# Patient Record
Sex: FEMALE | Race: BLACK OR AFRICAN AMERICAN | URBAN - METROPOLITAN AREA
[De-identification: names, ages, dates, MRNs, and addresses within clinical notes are randomized per-mention and may not be internally consistent; named-entity substitution may affect disease eponyms.]

---

## 2017-01-09 ENCOUNTER — OFFICE VISIT (OUTPATIENT)
Dept: NEUROLOGY | Age: 47
End: 2017-01-09

## 2017-01-09 VITALS
HEIGHT: 63 IN | SYSTOLIC BLOOD PRESSURE: 138 MMHG | BODY MASS INDEX: 29.06 KG/M2 | DIASTOLIC BLOOD PRESSURE: 70 MMHG | HEART RATE: 89 BPM | OXYGEN SATURATION: 98 % | WEIGHT: 164 LBS

## 2017-01-09 DIAGNOSIS — M79.641 RIGHT HAND PAIN: ICD-10-CM

## 2017-01-09 DIAGNOSIS — R20.0 NUMBNESS OF RIGHT HAND: Primary | ICD-10-CM

## 2017-01-09 RX ORDER — AMITRIPTYLINE HYDROCHLORIDE 10 MG/1
10 TABLET, FILM COATED ORAL
Qty: 30 TAB | Refills: 5 | Status: SHIPPED | OUTPATIENT
Start: 2017-01-09 | End: 2018-01-12

## 2017-01-09 NOTE — PATIENT INSTRUCTIONS
[facility]   Statement to Patients  April 1, 2014      In an effort to ensure the large volume of patient prescription refills is processed in the most efficient and expeditious manner, we are asking our patients to assist us by calling your Pharmacy for all prescription refills, this will include also your  Mail Order Pharmacy. The pharmacy will contact our office electronically to continue the refill process. Please do not wait until the last minute to call your pharmacy. We need at least 48 hours (2days) to fill prescriptions. We also encourage you to call your pharmacy before going to  your prescription to make sure it is ready. With regard to controlled substance prescription refill requests (narcotic refills) that need to be picked up at our office, we ask your cooperation by providing us with at least 72 hours (3days) notice that you will need a refill. We will not refill narcotic prescription refill requests after 4:00pm on any weekday, Monday through Thursday, or after 2:00pm on Fridays, or on the weekends. We encourage everyone to explore another way of getting your prescription refill request processed using [employer], our patient web portal through our electronic medical record system. [employer] is an efficient and effective way to communicate your medication request directly to the office and  downloadable as an deepali on your smart phone . [employer] also features a review functionality that allows you to view your medication list as well as leave messages for your physician. Are you ready to get connected? If so please review the attatched instructions or speak to any of our staff to get you set up right away! Thank you so much for your cooperation. Should you have any questions please contact our Practice Administrator.     The Physicians and Staff,  [facility]     Patient Instructions/Plans:         Carpal Tunnel Syndrome: Exercises  Your Care Instructions  Here are some examples of typical rehabilitation exercises for your condition. Start each exercise slowly. Ease off the exercise if you start to have pain. Your doctor or your physical or occupational therapist will tell you when you can start these exercises and which ones will work best for you. How to do the exercises  Note: When you no longer have pain or numbness, you can do exercises to help prevent carpal tunnel syndrome from coming back. Do not do any stretch or movement that is uncomfortable or painful. Warm-up stretches  1. Rotate your wrist up, down, and from side to side. Repeat 4 times. 2. Stretch your fingers far apart. Relax them, and then stretch them again. Repeat 4 times. 3. Stretch your thumb by pulling it back gently, holding it, and then releasing it. Repeat 4 times. Prayer stretch    1. Start with your palms together in front of your chest just below your chin. 2. Slowly lower your hands toward your waistline, keeping your hands close to your stomach and your palms together until you feel a mild to moderate stretch under your forearms. 3. Hold for at least 15 to 30 seconds. Repeat 2 to 4 times. Wrist flexor stretch    1. Extend your arm in front of you with your palm up. 2. Bend your wrist, pointing your hand toward the floor. 3. With your other hand, gently bend your wrist farther until you feel a mild to moderate stretch in your forearm. 4. Hold for at least 15 to 30 seconds. Repeat 2 to 4 times. Wrist extensor stretch    Repeat steps 1 through 4 of the stretch above, but begin with your extended hand palm down. Follow-up care is a key part of your treatment and safety. Be sure to make and go to all appointments, and call your doctor if you are having problems. It's also a good idea to know your test results and keep a list of the medicines you take. Where can you learn more?   Go to http://yari-jana.info/. Enter D126 in the search box to learn more about \"Carpal Tunnel Syndrome: Exercises. \"  Current as of: May 23, 2016  Content Version: 11.1  © 4258-8815 [employer]. Care instructions adapted under license by [employer] (which disclaims liability or warranty for this information). If you have questions about a medical condition or this instruction, always ask your healthcare professional. [address] any warranty or liability for your use of this information. Amitriptyline (By mouth)   Amitriptyline (am-i-TRIP-ti-darren)  Treats depression. This medicine is a TCA. Brand Name(s):   There may be other brand names for this medicine. When This Medicine Should Not Be Used: This medicine is not right for everyone. Do not use if you had an allergic reaction to amitriptyline. How to Use This Medicine:   Tablet  · Take your medicine as directed. Your dose may need to be changed several times to find what works best for you. · This medicine should come with a Medication Guide. Ask your pharmacist for a copy if you do not have one. · Store the medicine in a closed container at room temperature, away from heat, moisture, and direct light. · Missed dose: Take a dose as soon as you remember. If it is almost time for your next dose, wait until then and take a regular dose. Do not take extra medicine to make up for a missed dose. Drugs and Foods to Avoid:   Ask your doctor or pharmacist before using any other medicine, including over-the-counter medicines, vitamins, and herbal products. · Do not use this medicine with cisapride. Do not use this medicine and an MAO inhibitor (MAOI) within 14 days of each other. · Some medicines and foods can affect how amitriptyline works. Tell your doctor if you are using cimetidine, disulfiram, or guanethidine.  Tell your doctor if you are using other medicine for depression (such as fluoxetine, paroxetine, sertraline), a phenothiazine medicine (such as promethazine, chlorpromazine), medicine for heart rhythm problems (such as flecainide, propafenone, quinidine), or thyroid medicine. · Tell your doctor if you use anything else that makes you sleepy. Some examples are allergy medicine, narcotic pain medicine, and alcohol. Warnings While Using This Medicine:   · Tell your doctor if you are pregnant or breastfeeding, or if you have liver disease, heart disease, diabetes, narrow-angle glaucoma, a seizure disorder, a thyroid problem, or trouble urinating. · For some children, teenagers, and young adults, this medicine may increase mental or emotional problems. This may lead to thoughts of suicide and violence. Talk with your doctor right away if you have any thoughts or behavior changes that concern you. Tell your doctor if you or anyone in your family has a history of bipolar disorder or suicide attempts. · This medicine may cause the following problems:   ¨ Heart rhythm problems  ¨ High or low blood sugar levels  · This medicine may make you dizzy or drowsy. Do not drive or do anything else that could be dangerous until you know how this medicine affects you. · Do not stop using this medicine suddenly. Your doctor will need to slowly decrease your dose before you stop it completely. · Keep all medicine out of the reach of children. Never share your medicine with anyone.   Possible Side Effects While Using This Medicine:   Call your doctor right away if you notice any of these side effects:  · Allergic reaction: Itching or hives, swelling in your face or hands, swelling or tingling in your mouth or throat, chest tightness, trouble breathing  · Anxiety, restlessness, seeing or hearing things that are not there  · Chest pain, trouble breathing  · Fast, pounding, or uneven heartbeat  · Feeling more excited or energetic than usual, racing thoughts, trouble sleeping  · Lightheadedness, dizziness, or fainting  · Seizures  · Thoughts of hurting yourself or others, unusual behavior  · Unusual bleeding or bruising  If you notice these less serious side effects, talk with your doctor:   · Blurred vision, dry mouth, fever  · Change in how much or how often you urinate  · Constipation, diarrhea, nausea, vomiting  · Drowsiness, sleepiness  · Sexual problems  If you notice other side effects that you think are caused by this medicine, tell your doctor. Call your doctor for medical advice about side effects. You may report side effects to FDA at 8-223-GJQ-7245  © 2016 [address] is for End User's use only and may not be sold, redistributed or otherwise used for commercial purposes. The above information is an  only. It is not intended as medical advice for individual conditions or treatments. Talk to your doctor, nurse or pharmacist before following any medical regimen to see if it is safe and effective for you.

## 2017-01-09 NOTE — MR AVS SNAPSHOT
Visit Information Date & Time Provider Department Dept. Phone Encounter #  
 1/9/2017 10:00 AM Praveena Ortiz MD [facility] [phone] [id number] Your Appointments 1/23/2017  3:00 PM  
COMPLETE PHYSICAL with Giovanni Rasheed [facility], [address] ([facility]) Appt Note: chp  
 [address] 
[address] [place] [id number]  
[phone]  
  
   
 [address] [facility] [address] [facility] [address] [facility] Upcoming Health Maintenance Date Due DTaP/Tdap/Td series (1 - Tdap) 2/27/2017* PAP AKA CERVICAL CYTOLOGY 11/17/2019 *Topic was postponed. The date shown is not the original due date. Allergies as of 1/9/2017  Review Complete On: 1/9/2017 By: Zafar Napier LPN Severity Noted Reaction Type Reactions Percocet [Oxycodone-acetaminophen]  10/26/2012    Other (comments) Feels like bugs are crawling on her. Current Immunizations  Never Reviewed No immunizations on file. Not reviewed this visit You Were Diagnosed With   
  
 Codes Comments Numbness of right hand    -  Primary ICD-10-CM: R20.0 ICD-9-CM: 782.0 Right hand pain     ICD-10-CM: M79.641 ICD-9-CM: 729.5 Vitals BP Pulse Height(growth percentile) Weight(growth percentile) SpO2 BMI  
 138/70 89 5' 3\" (1.6 m) 164 lb (74.4 kg) 98% 29.05 kg/m2 OB Status Smoking Status Having regular periods Never Smoker Vitals History BMI and BSA Data Body Mass Index Body Surface Area 29.05 kg/m 2 1.82 m 2 Preferred [facility] [phone] [facility] [address], [address] AT [facility] [address] [phone] Your Updated Medication List  
  
   
This list is accurate as of: 1/9/17 10:48 AM.  Always use your most recent med list.  
  
  
  
  
 amitriptyline 10 mg tablet Commonly known as:  ELAVIL  
 Take 1 Tab by mouth nightly. Arm Brace Misc Commonly known as:  NEOPRENE WRIST SPLINT SUPPORT Wrist splint for right and left. For carpal tunnel. Splint should hold wrist in about 30-45 degrees of extension. Take to medical supply store. Prescriptions Sent to Pharmacy Refills  
 amitriptyline (ELAVIL) 10 mg tablet 5 Sig: Take 1 Tab by mouth nightly. Class: Normal  
 Pharmacy: [facility] [address] [place] [address] [address] [place] [address] Ph #: [phone] Route: Oral  
  
We Performed the Following REFERRAL TO NEUROLOGY [QYY91 Custom] Comments:  
 EMG/NCS for eval for CTS. R>L Referral Information Referral ID Referred By Referred To  
  
 [id number] Junito VALDEZ MD   
   [address] [place] [address] [address] [facility] [address], [address] [facility] Phone: [phone] Fax: [phone] Visits Status Start Date End Date 1 New Request 1/9/17 1/9/18 If your referral has a status of pending review or denied, additional information will be sent to support the outcome of this decision. Patient Instructions PRESCRIPTION REFILL POLICY [facility] Statement to Patients April 1, 2014 In an effort to ensure the large volume of patient prescription refills is processed in the most efficient and expeditious manner, we are asking our patients to assist us by calling your Pharmacy for all prescription refills, this will include also your  Mail Order Pharmacy. The pharmacy will contact our office electronically to continue the refill process. Please do not wait until the last minute to call your pharmacy. We need at least 48 hours (2days) to fill prescriptions. We also encourage you to call your pharmacy before going to  your prescription to make sure it is ready.   
 
With regard to controlled substance prescription refill requests (narcotic refills) that need to be picked up at our office, we ask your cooperation by providing us with at least 72 hours (3days) notice that you will need a refill. We will not refill narcotic prescription refill requests after 4:00pm on any weekday, Monday through Thursday, or after 2:00pm on Fridays, or on the weekends. We encourage everyone to explore another way of getting your prescription refill request processed using [employer], our patient web portal through our electronic medical record system. [employer] is an efficient and effective way to communicate your medication request directly to the office and  downloadable as an deepali on your smart phone . [employer] also features a review functionality that allows you to view your medication list as well as leave messages for your physician. Are you ready to get connected? If so please review the attatched instructions or speak to any of our staff to get you set up right away! Thank you so much for your cooperation. Should you have any questions please contact our Practice Administrator. The Physicians and Staff,  [facility] Patient Instructions/Plans: 
 
 
  
Carpal Tunnel Syndrome: Exercises Your Care Instructions Here are some examples of typical rehabilitation exercises for your condition. Start each exercise slowly. Ease off the exercise if you start to have pain. Your doctor or your physical or occupational therapist will tell you when you can start these exercises and which ones will work best for you. How to do the exercises Note: When you no longer have pain or numbness, you can do exercises to help prevent carpal tunnel syndrome from coming back. Do not do any stretch or movement that is uncomfortable or painful. Warm-up stretches 1. Rotate your wrist up, down, and from side to side. Repeat 4 times. 2. Stretch your fingers far apart. Relax them, and then stretch them again. Repeat 4 times. 3. Stretch your thumb by pulling it back gently, holding it, and then releasing it. Repeat 4 times. Prayer stretch 1. Start with your palms together in front of your chest just below your chin. 2. Slowly lower your hands toward your waistline, keeping your hands close to your stomach and your palms together until you feel a mild to moderate stretch under your forearms. 3. Hold for at least 15 to 30 seconds. Repeat 2 to 4 times. Wrist flexor stretch 1. Extend your arm in front of you with your palm up. 2. Bend your wrist, pointing your hand toward the floor. 3. With your other hand, gently bend your wrist farther until you feel a mild to moderate stretch in your forearm. 4. Hold for at least 15 to 30 seconds. Repeat 2 to 4 times. Wrist extensor stretch Repeat steps 1 through 4 of the stretch above, but begin with your extended hand palm down. Follow-up care is a key part of your treatment and safety. Be sure to make and go to all appointments, and call your doctor if you are having problems. It's also a good idea to know your test results and keep a list of the medicines you take. Where can you learn more? Go to http://yari-jana.info/. Enter Y256 in the search box to learn more about \"Carpal Tunnel Syndrome: Exercises. \" Current as of: May 23, 2016 Content Version: 11.1 © 1534-4031 [employer]. Care instructions adapted under license by [employer] (which disclaims liability or warranty for this information). If you have questions about a medical condition or this instruction, always ask your healthcare professional. [place] [address] any warranty or liability for your use of this information. Amitriptyline (By mouth) Amitriptyline (am-i-TRIP-ti-darren) Treats depression. This medicine is a TCA. Brand Name(s):  
There may be other brand names for this medicine. When This Medicine Should Not Be Used: This medicine is not right for everyone. Do not use if you had an allergic reaction to amitriptyline. How to Use This Medicine:  
Tablet · Take your medicine as directed. Your dose may need to be changed several times to find what works best for you. · This medicine should come with a Medication Guide. Ask your pharmacist for a copy if you do not have one. · Store the medicine in a closed container at room temperature, away from heat, moisture, and direct light. · Missed dose: Take a dose as soon as you remember. If it is almost time for your next dose, wait until then and take a regular dose. Do not take extra medicine to make up for a missed dose. Drugs and Foods to Avoid: Ask your doctor or pharmacist before using any other medicine, including over-the-counter medicines, vitamins, and herbal products. · Do not use this medicine with cisapride. Do not use this medicine and an MAO inhibitor (MAOI) within 14 days of each other. · Some medicines and foods can affect how amitriptyline works. Tell your doctor if you are using cimetidine, disulfiram, or guanethidine. Tell your doctor if you are using other medicine for depression (such as fluoxetine, paroxetine, sertraline), a phenothiazine medicine (such as promethazine, chlorpromazine), medicine for heart rhythm problems (such as flecainide, propafenone, quinidine), or thyroid medicine. · Tell your doctor if you use anything else that makes you sleepy. Some examples are allergy medicine, narcotic pain medicine, and alcohol. Warnings While Using This Medicine: · Tell your doctor if you are pregnant or breastfeeding, or if you have liver disease, heart disease, diabetes, narrow-angle glaucoma, a seizure disorder, a thyroid problem, or trouble urinating. · For some children, teenagers, and young adults, this medicine may increase mental or emotional problems.  This may lead to thoughts of suicide and violence. Talk with your doctor right away if you have any thoughts or behavior changes that concern you. Tell your doctor if you or anyone in your family has a history of bipolar disorder or suicide attempts. · This medicine may cause the following problems:  
¨ Heart rhythm problems ¨ High or low blood sugar levels · This medicine may make you dizzy or drowsy. Do not drive or do anything else that could be dangerous until you know how this medicine affects you. · Do not stop using this medicine suddenly. Your doctor will need to slowly decrease your dose before you stop it completely. · Keep all medicine out of the reach of children. Never share your medicine with anyone. Possible Side Effects While Using This Medicine:  
Call your doctor right away if you notice any of these side effects: · Allergic reaction: Itching or hives, swelling in your face or hands, swelling or tingling in your mouth or throat, chest tightness, trouble breathing · Anxiety, restlessness, seeing or hearing things that are not there · Chest pain, trouble breathing · Fast, pounding, or uneven heartbeat · Feeling more excited or energetic than usual, racing thoughts, trouble sleeping · Lightheadedness, dizziness, or fainting · Seizures · Thoughts of hurting yourself or others, unusual behavior · Unusual bleeding or bruising If you notice these less serious side effects, talk with your doctor: · Blurred vision, dry mouth, fever · Change in how much or how often you urinate · Constipation, diarrhea, nausea, vomiting · Drowsiness, sleepiness · Sexual problems If you notice other side effects that you think are caused by this medicine, tell your doctor. Call your doctor for medical advice about side effects. You may report side effects to FDA at 4-699-FDA-0112 © 2016 [address] is for End User's use only and may not be sold, redistributed or otherwise used for commercial purposes. The above information is an  only. It is not intended as medical advice for individual conditions or treatments. Talk to your doctor, nurse or pharmacist before following any medical regimen to see if it is safe and effective for you. Introducing [facility]! Dear Devon Perez: Thank you for requesting a [employer] account. Our records indicate that you already have an active [employer] account. You can access your account anytime at [email or web address] Did you know that you can access your hospital and ER discharge instructions at any time in [employer]? You can also review all of your test results from your hospital stay or ER visit. Additional Information If you have questions, please visit the Frequently Asked Questions section of the [employer] website at [email or web address] Remember, [employer] is NOT to be used for urgent needs. For medical emergencies, dial 911. Now available from your iPhone and Android! Please provide this summary of care documentation to your next provider. Your primary care clinician is listed as [place] [employer]. Jamee Vanegas. If you have any questions after today's visit, please call [phone].
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
## 2017-01-23 ENCOUNTER — OFFICE VISIT (OUTPATIENT)
Dept: FAMILY MEDICINE CLINIC | Age: 47
End: 2017-01-23

## 2017-01-23 VITALS
WEIGHT: 161.4 LBS | RESPIRATION RATE: 16 BRPM | TEMPERATURE: 98 F | SYSTOLIC BLOOD PRESSURE: 131 MMHG | HEIGHT: 63 IN | HEART RATE: 73 BPM | DIASTOLIC BLOOD PRESSURE: 67 MMHG | BODY MASS INDEX: 28.6 KG/M2 | OXYGEN SATURATION: 97 %

## 2017-01-23 DIAGNOSIS — R20.2 PARESTHESIA: ICD-10-CM

## 2017-01-23 DIAGNOSIS — R73.03 PREDIABETES: ICD-10-CM

## 2017-01-23 DIAGNOSIS — J31.0 OTHER RHINITIS: ICD-10-CM

## 2017-01-23 DIAGNOSIS — M22.2X9 PATELLOFEMORAL PAIN SYNDROME, UNSPECIFIED LATERALITY: ICD-10-CM

## 2017-01-23 DIAGNOSIS — Z01.419 WELL WOMAN EXAM: Primary | ICD-10-CM

## 2017-01-23 DIAGNOSIS — R09.89 RALES: ICD-10-CM

## 2017-01-23 DIAGNOSIS — T14.8XXA MUSCLE STRAIN: ICD-10-CM

## 2017-01-23 RX ORDER — FLUTICASONE PROPIONATE 50 MCG
2 SPRAY, SUSPENSION (ML) NASAL DAILY
Qty: 1 BOTTLE | Refills: 11 | Status: SHIPPED | OUTPATIENT
Start: 2017-01-23 | End: 2018-01-12

## 2017-01-23 NOTE — LETTER
1/23/2017 3:40 PM 
 
[facility] [address] [address] No visits with results within 1 Month(s) from this visit. Latest known visit with results is: 
 
Office Visit on 11/29/2016 Component Date Value Ref Range Status  Hemoglobin A1c 12/05/2016 5.8* 4.8 - 5.6 % Final  
 Estimated average glucose 12/05/2016 120  mg/dL Final  
 Glucose 12/05/2016 84  65 - 99 mg/dL Final  
 BUN 12/05/2016 12  6 - 24 mg/dL Final  
 Creatinine 12/05/2016 0.77  0.57 - 1.00 mg/dL Final  
 GFR est non-AA 12/05/2016 94  >59 mL/min/1.73 Final  
 GFR est AA 12/05/2016 108  >59 mL/min/1.73 Final  
 BUN/Creatinine ratio 12/05/2016 16  9 - 23 Final  
 Sodium 12/05/2016 141  136 - 144 mmol/L Final  
 Potassium 12/05/2016 4.2  3.5 - 5.2 mmol/L Final  
 Chloride 12/05/2016 103  97 - 106 mmol/L Final  
 CO2 12/05/2016 24  18 - 29 mmol/L Final  
 Calcium 12/05/2016 9.2  8.7 - 10.2 mg/dL Final  
 Protein, total 12/05/2016 6.4  6.0 - 8.5 g/dL Final  
 Albumin 12/05/2016 4.0  3.5 - 5.5 g/dL Final  
 GLOBULIN, TOTAL 12/05/2016 2.4  1.5 - 4.5 g/dL Final  
 A-G Ratio 12/05/2016 1.7  1.1 - 2.5 Final  
 Bilirubin, total 12/05/2016 0.2  0.0 - 1.2 mg/dL Final  
 Alk. phosphatase 12/05/2016 58  39 - 117 IU/L Final  
 AST 12/05/2016 18  0 - 40 IU/L Final  
 ALT 12/05/2016 17  0 - 32 IU/L Final  
 WBC 12/05/2016 5.0  3.4 - 10.8 x10E3/uL Final  
 RBC 12/05/2016 4.10  3.77 - 5.28 x10E6/uL Final  
 HGB 12/05/2016 11.4  11.1 - 15.9 g/dL Final  
 HCT 12/05/2016 36.4  34.0 - 46.6 % Final  
 MCV 12/05/2016 89  79 - 97 fL Final  
 MCH 12/05/2016 27.8  26.6 - 33.0 pg Final  
 MCHC 12/05/2016 31.3* 31.5 - 35.7 g/dL Final  
 RDW 12/05/2016 14.4  12.3 - 15.4 % Final  
 PLATELET 53/84/1797 645  150 - 379 x10E3/uL Final  
 
 
 
 
Sincerely, 
 
 
Giovanni GrimaldoNovant Health [facility]ant[facility], MD

## 2017-01-23 NOTE — MR AVS SNAPSHOT
Visit Information Date & Time Provider Department Dept. Phone Encounter #  
 1/23/2017  3:00 PM Elver Catalan. Jorge Mejia MD [facility] [phone] [id number] Your Appointments 2/20/2017 11:00 AM  
PROCEDURE with Agatha Underwood MD  
[facility]) Appt Note: EMG $0CP CC tdb 1/9/17  
 [address] [place] [address], [address] [phone]  
[address], [address] [place] [address], [address] [id number]  
  
    
 4/10/2017 10:00 AM  
Follow Up with Natalia Lopez NP [facility]) Appt Note: Follow up $0CP tdb 1/9/17  
 [address] [place] [address], 
[address] [place] [address], [address] [phone]  
[address] [facility], [address] [place] [address], [address] [phone] Upcoming Health Maintenance Date Due DTaP/Tdap/Td series (1 - Tdap) 2/27/2017* PAP AKA CERVICAL CYTOLOGY 11/24/2019 *Topic was postponed. The date shown is not the original due date. Allergies as of 1/23/2017  Review Complete On: 1/23/2017 By: Tiarra Wilder RN Severity Noted Reaction Type Reactions Percocet [Oxycodone-acetaminophen]  10/26/2012    Other (comments) Feels like bugs are crawling on her. Current Immunizations  Never Reviewed No immunizations on file. Not reviewed this visit You Were Diagnosed With   
  
 Codes Comments Prediabetes    -  Primary ICD-10-CM: R73.03 
ICD-9-CM: 790.29 Rales     ICD-10-CM: R09.89 ICD-9-CM: 626. 7 Well woman exam     ICD-10-CM: Y19.506 ICD-9-CM: V72.31 Other rhinitis     ICD-10-CM: J31.0 Vitals BP Pulse Temp Resp Height(growth percentile) Weight(growth percentile) 131/67 (BP 1 Location: Left arm, BP Patient Position: Sitting) 73 98 °F (36.7 °C) (Oral) 16 5' 3.25\" (1.607 m) 161 lb 6.4 oz (73.2 kg) LMP SpO2 BMI OB Status Smoking Status 2017 97% 28.37 kg/m2 Having regular periods Never Smoker Vitals History BMI and BSA Data Body Mass Index Body Surface Area  
 28.37 kg/m 2 1.81 m 2 Preferred Pharmacy Pharmacy Name Phone [facility] [phone] , [address] [facility] [place] [address] [phone] Your Updated Medication List  
  
   
This list is accurate as of: 17  4:30 PM.  Always use your most recent med list.  
  
  
  
  
 amitriptyline 10 mg tablet Commonly known as:  ELAVIL Take 1 Tab by mouth nightly. fluticasone 50 mcg/actuation nasal spray Commonly known as:  Klever Redo 2 Sprays by Both Nostrils route daily. Prescriptions Sent to Pharmacy Refills  
 fluticasone (FLONASE) 50 mcg/actuation nasal spray 11 Si Sprays by Both Nostrils route daily. Class: Normal  
 Pharmacy: [facility] [address] [place] [address] Ph #: [phone] Route: Both Nostrils To-Do List   
 2017 Imaging:  XR CHEST PA LAT Patient Instructions TODAY, go to: CHECK OUT Please schedule the following appointments: 
· Chest Xray · Lab visit, fasting, lipids, in 5-6 months · Prediabetes and cholesterol follow up with Dr. Tony Mason the week after labs 
 
_____________________ Today you were seen for: 
 
You have prediabetes 
- cut down on your sugar intake 
- no soda 
- decrease amount of sweet teat 
- limit candy and sweets For nose symptoms- start flonase For your back and neck- continue ibuprofen or acetaminophen when needed. Consider stretching, ice, heat, or massage. IcyHot. _____________________ Review your health maintenance below. Make plans to return and address anything that is due or will be due soon. There are no preventive care reminders to display for this patient. Amitriptyline (By mouth) Amitriptyline (am-i-TRIP-ti-darren) Treats depression. This medicine is a TCA. Brand Name(s):  
There may be other brand names for this medicine. When This Medicine Should Not Be Used: This medicine is not right for everyone. Do not use if you had an allergic reaction to amitriptyline. How to Use This Medicine:  
Tablet · Take your medicine as directed. Your dose may need to be changed several times to find what works best for you. · This medicine should come with a Medication Guide. Ask your pharmacist for a copy if you do not have one. · Store the medicine in a closed container at room temperature, away from heat, moisture, and direct light. · Missed dose: Take a dose as soon as you remember. If it is almost time for your next dose, wait until then and take a regular dose. Do not take extra medicine to make up for a missed dose. Drugs and Foods to Avoid: Ask your doctor or pharmacist before using any other medicine, including over-the-counter medicines, vitamins, and herbal products. · Do not use this medicine with cisapride. Do not use this medicine and an MAO inhibitor (MAOI) within 14 days of each other. · Some medicines and foods can affect how amitriptyline works. Tell your doctor if you are using cimetidine, disulfiram, or guanethidine. Tell your doctor if you are using other medicine for depression (such as fluoxetine, paroxetine, sertraline), a phenothiazine medicine (such as promethazine, chlorpromazine), medicine for heart rhythm problems (such as flecainide, propafenone, quinidine), or thyroid medicine. · Tell your doctor if you use anything else that makes you sleepy. Some examples are allergy medicine, narcotic pain medicine, and alcohol. Warnings While Using This Medicine: · Tell your doctor if you are pregnant or breastfeeding, or if you have liver disease, heart disease, diabetes, narrow-angle glaucoma, a seizure disorder, a thyroid problem, or trouble urinating. · For some children, teenagers, and young adults, this medicine may increase mental or emotional problems. This may lead to thoughts of suicide and violence. Talk with your doctor right away if you have any thoughts or behavior changes that concern you. Tell your doctor if you or anyone in your family has a history of bipolar disorder or suicide attempts. · This medicine may cause the following problems:  
¨ Heart rhythm problems ¨ High or low blood sugar levels · This medicine may make you dizzy or drowsy. Do not drive or do anything else that could be dangerous until you know how this medicine affects you. · Do not stop using this medicine suddenly. Your doctor will need to slowly decrease your dose before you stop it completely. · Keep all medicine out of the reach of children. Never share your medicine with anyone. Possible Side Effects While Using This Medicine:  
Call your doctor right away if you notice any of these side effects: · Allergic reaction: Itching or hives, swelling in your face or hands, swelling or tingling in your mouth or throat, chest tightness, trouble breathing · Anxiety, restlessness, seeing or hearing things that are not there · Chest pain, trouble breathing · Fast, pounding, or uneven heartbeat · Feeling more excited or energetic than usual, racing thoughts, trouble sleeping · Lightheadedness, dizziness, or fainting · Seizures · Thoughts of hurting yourself or others, unusual behavior · Unusual bleeding or bruising If you notice these less serious side effects, talk with your doctor: · Blurred vision, dry mouth, fever · Change in how much or how often you urinate · Constipation, diarrhea, nausea, vomiting · Drowsiness, sleepiness · Sexual problems If you notice other side effects that you think are caused by this medicine, tell your doctor. Call your doctor for medical advice about side effects. You may report side effects to FDA at 0-734-ULK-8158 © 2016 [address] is for End User's use only and may not be sold, redistributed or otherwise used for commercial purposes. The above information is an  only. It is not intended as medical advice for individual conditions or treatments. Talk to your doctor, nurse or pharmacist before following any medical regimen to see if it is safe and effective for you. Prediabetes: Care Instructions Your Care Instructions Prediabetes is a warning sign that you are at risk for getting type 2 diabetes. It means that your blood sugar is higher than it should be. The food you eat turns into sugar, which your body uses for energy. Normally, an organ called the pancreas makes insulin, which allows the sugar in your blood to get into your body's cells. But when your body can't use insulin the right way, the sugar doesn't move into cells. It stays in your blood instead. This is called insulin resistance. The buildup of sugar in the blood causes prediabetes. The good news is that lifestyle changes may help you get your blood sugar back to normal and help you avoid or delay diabetes. Follow-up care is a key part of your treatment and safety. Be sure to make and go to all appointments, and call your doctor if you are having problems. It's also a good idea to know your test results and keep a list of the medicines you take. How can you care for yourself at home? · Watch your weight. A healthy weight helps your body use insulin properly. · Limit the amount of calories, sweets, and unhealthy fat you eat. Ask your doctor if you should see a dietitian. A registered dietitian can help you create meal plans that fit your lifestyle. · Get at least 30 minutes of exercise on most days of the week. Exercise helps control your blood sugar. It also helps you maintain a healthy weight. Walking is a good choice.  You also may want to do other activities, such as running, swimming, cycling, or playing tennis or team sports. · Do not smoke. Smoking can make prediabetes worse. If you need help quitting, talk to your doctor about stop-smoking programs and medicines. These can increase your chances of quitting for good. · If your doctor prescribed medicines, take them exactly as prescribed. Call your doctor if you think you are having a problem with your medicine. You will get more details on the specific medicines your doctor prescribes. When should you call for help? Watch closely for changes in your health, and be sure to contact your doctor if: 
· You have any symptoms of diabetes. These may include: ¨ Being thirsty more often. ¨ Urinating more. ¨ Being hungrier. ¨ Losing weight. ¨ Being very tired. ¨ Having blurry vision. · You have a wound that will not heal. 
· You have an infection that will not go away. · You have problems with your blood pressure. · You want more information about diabetes and how you can keep from getting it. Where can you learn more? Go to http://yari-jana.info/. Enter I222 in the search box to learn more about \"Prediabetes: Care Instructions. \" Current as of: May 23, 2016 Content Version: 11.1 © 6460-2782 [employer]. Care instructions adapted under license by [employer] (which disclaims liability or warranty for this information). If you have questions about a medical condition or this instruction, always ask your healthcare professional. [address] any warranty or liability for your use of this information. Introducing [facility]! Dear Blu Amanda: Thank you for requesting a [employer] account. Our records indicate that you already have an active Ye[employer]oo account. You can access your account anytime at [email or web address]. [email or web address] Did you know that you can access your hospital and ER discharge instructions at any time in [employer]? You can also review all of your test results from your hospital stay or ER visit. Additional Information If you have questions, please visit the Frequently Asked Questions section of the [employer] website at [email or web address] Remember, [employer] is NOT to be used for urgent needs. For medical emergencies, dial 911. Now available from your iPhone and Android! Please provide this summary of care documentation to your next provider. Your primary care clinician is listed as Carmenza Estevez. If you have any questions after today's visit, please call [phone].

## 2017-01-23 NOTE — PROGRESS NOTES
Chief Complaint   Patient presents with    Complete Physical     1. Have you been to the ER, urgent care clinic since your last visit? Hospitalized since your last visit? No    2. Have you seen or consulted any other health care providers outside of the [address] [place] [address] since your last visit? Include any pap smears or colon screening. No  I have reviewed Health Maintenance with the patient and updated. Advance Care Planning information reviewed and given to the patient at previous visit. The patient was counseled on the dangers of tobacco use, and Patient is a non smoker. Reviewed strategies to maximize success, including Continue not to smoke.

## 2017-01-23 NOTE — PATIENT INSTRUCTIONS
TODAY, go to:   CHECK OUT    Please schedule the following appointments:  · Chest Xray    · Lab visit, fasting, lipids, in 5-6 months  · Prediabetes and cholesterol follow up with Dr. Serjio Rey the week after labs    _____________________     Today you were seen for:    You have prediabetes  - cut down on your sugar intake  - no soda  - decrease amount of sweet teat  - limit candy and sweets      For nose symptoms- start flonase    For your back and neck- continue ibuprofen or acetaminophen when needed. Consider stretching, ice, heat, or massage. IcyHot. _____________________     Review your health maintenance below. Make plans to return and address anything that is due or will be due soon. There are no preventive care reminders to display for this patient. Amitriptyline (By mouth)   Amitriptyline (am-i-TRIP-ti-darren)  Treats depression. This medicine is a TCA. Brand Name(s):   There may be other brand names for this medicine. When This Medicine Should Not Be Used: This medicine is not right for everyone. Do not use if you had an allergic reaction to amitriptyline. How to Use This Medicine:   Tablet  · Take your medicine as directed. Your dose may need to be changed several times to find what works best for you. · This medicine should come with a Medication Guide. Ask your pharmacist for a copy if you do not have one. · Store the medicine in a closed container at room temperature, away from heat, moisture, and direct light. · Missed dose: Take a dose as soon as you remember. If it is almost time for your next dose, wait until then and take a regular dose. Do not take extra medicine to make up for a missed dose. Drugs and Foods to Avoid:   Ask your doctor or pharmacist before using any other medicine, including over-the-counter medicines, vitamins, and herbal products. · Do not use this medicine with cisapride.  Do not use this medicine and an MAO inhibitor (MAOI) within 14 days of each other.  · Some medicines and foods can affect how amitriptyline works. Tell your doctor if you are using cimetidine, disulfiram, or guanethidine. Tell your doctor if you are using other medicine for depression (such as fluoxetine, paroxetine, sertraline), a phenothiazine medicine (such as promethazine, chlorpromazine), medicine for heart rhythm problems (such as flecainide, propafenone, quinidine), or thyroid medicine. · Tell your doctor if you use anything else that makes you sleepy. Some examples are allergy medicine, narcotic pain medicine, and alcohol. Warnings While Using This Medicine:   · Tell your doctor if you are pregnant or breastfeeding, or if you have liver disease, heart disease, diabetes, narrow-angle glaucoma, a seizure disorder, a thyroid problem, or trouble urinating. · For some children, teenagers, and young adults, this medicine may increase mental or emotional problems. This may lead to thoughts of suicide and violence. Talk with your doctor right away if you have any thoughts or behavior changes that concern you. Tell your doctor if you or anyone in your family has a history of bipolar disorder or suicide attempts. · This medicine may cause the following problems:   ¨ Heart rhythm problems  ¨ High or low blood sugar levels  · This medicine may make you dizzy or drowsy. Do not drive or do anything else that could be dangerous until you know how this medicine affects you. · Do not stop using this medicine suddenly. Your doctor will need to slowly decrease your dose before you stop it completely. · Keep all medicine out of the reach of children. Never share your medicine with anyone.   Possible Side Effects While Using This Medicine:   Call your doctor right away if you notice any of these side effects:  · Allergic reaction: Itching or hives, swelling in your face or hands, swelling or tingling in your mouth or throat, chest tightness, trouble breathing  · Anxiety, restlessness, seeing or hearing things that are not there  · Chest pain, trouble breathing  · Fast, pounding, or uneven heartbeat  · Feeling more excited or energetic than usual, racing thoughts, trouble sleeping  · Lightheadedness, dizziness, or fainting  · Seizures  · Thoughts of hurting yourself or others, unusual behavior  · Unusual bleeding or bruising  If you notice these less serious side effects, talk with your doctor:   · Blurred vision, dry mouth, fever  · Change in how much or how often you urinate  · Constipation, diarrhea, nausea, vomiting  · Drowsiness, sleepiness  · Sexual problems  If you notice other side effects that you think are caused by this medicine, tell your doctor. Call your doctor for medical advice about side effects. You may report side effects to FDA at 6-787-ZHJ-2253  © 2016 [address] is for End User's use only and may not be sold, redistributed or otherwise used for commercial purposes. The above information is an  only. It is not intended as medical advice for individual conditions or treatments. Talk to your doctor, nurse or pharmacist before following any medical regimen to see if it is safe and effective for you. Prediabetes: Care Instructions  Your Care Instructions  Prediabetes is a warning sign that you are at risk for getting type 2 diabetes. It means that your blood sugar is higher than it should be. The food you eat turns into sugar, which your body uses for energy. Normally, an organ called the pancreas makes insulin, which allows the sugar in your blood to get into your body's cells. But when your body can't use insulin the right way, the sugar doesn't move into cells. It stays in your blood instead. This is called insulin resistance. The buildup of sugar in the blood causes prediabetes. The good news is that lifestyle changes may help you get your blood sugar back to normal and help you avoid or delay diabetes.   Follow-up care is a key part of your treatment and safety. Be sure to make and go to all appointments, and call your doctor if you are having problems. It's also a good idea to know your test results and keep a list of the medicines you take. How can you care for yourself at home? · Watch your weight. A healthy weight helps your body use insulin properly. · Limit the amount of calories, sweets, and unhealthy fat you eat. Ask your doctor if you should see a dietitian. A registered dietitian can help you create meal plans that fit your lifestyle. · Get at least 30 minutes of exercise on most days of the week. Exercise helps control your blood sugar. It also helps you maintain a healthy weight. Walking is a good choice. You also may want to do other activities, such as running, swimming, cycling, or playing tennis or team sports. · Do not smoke. Smoking can make prediabetes worse. If you need help quitting, talk to your doctor about stop-smoking programs and medicines. These can increase your chances of quitting for good. · If your doctor prescribed medicines, take them exactly as prescribed. Call your doctor if you think you are having a problem with your medicine. You will get more details on the specific medicines your doctor prescribes. When should you call for help? Watch closely for changes in your health, and be sure to contact your doctor if:  · You have any symptoms of diabetes. These may include:  ¨ Being thirsty more often. ¨ Urinating more. ¨ Being hungrier. ¨ Losing weight. ¨ Being very tired. ¨ Having blurry vision. · You have a wound that will not heal.  · You have an infection that will not go away. · You have problems with your blood pressure. · You want more information about diabetes and how you can keep from getting it. Where can you learn more? Go to http://yari-jana.info/. Enter I222 in the search box to learn more about \"Prediabetes: Care Instructions. \"  Current as of: May 23, 2016  Content Version: 11.1  © 5398-8346 [employer]. Care instructions adapted under license by [employer] (which disclaims liability or warranty for this information). If you have questions about a medical condition or this instruction, always ask your healthcare professional. [address] any warranty or liability for your use of this information.

## 2017-01-23 NOTE — PROGRESS NOTES
[address] Visit   Patient ID:   Emery Haro is a 40 y.o. female. Assessment/Plan:    Celina Sorenson was seen today for complete physical.    Diagnoses and all orders for this visit:    Well woman exam  #Healthcare maintenance/ Preventive:  - discussed diet and exercise  - recommended dental and vision screenings  - discussed STD risks and screening   - screened for domestic violence: neg  - screened for alcohol abuse: neg  - screened for depression: neg  - screened for tobacco use: neg  - smoking cessation counseling: na  - mammogram: requesting outside record  - pap smear: UTD  -DUE FOR LIPIDS, check with next labs      Rhonchi  Expiratory in RUL, posterior field. Pt has mild uri vs allerghic rhinitis. H/w given focal natural will eval with CXR. -     XR CHEST PA LAT; Future    Prediabetes  New Dx. Discussed natural course and importance of blood sugar control. Recommended decrease sugar intake. Repeat a1c in 5-6 months. -     LIPID PANEL; Future  -     HEMOGLOBIN A1C WITH EAG; Future    Other rhinitis  flonase for uri vs allergic rhinitis  -     fluticasone (FLONASE) 50 mcg/actuation nasal spray; 2 Sprays by Both Nostrils route daily. Muscle strain  Likely 2/2 physical demands of work. recommend OTC tylenol vs ibuprofen, ice, heat, massage, icy hot. Paresthesia  Continue to f/u with Neurology as planned. Reviewed use of amitriptyline, which she has not started b/c she wants to research this. Gave pt info on medication. Recommend review and then start medicine prior to neuro follow up    Patellofemoral pain syndrome, unspecified laterality  Anticipate 6-8 weeks of PT then can continue at home    Counselled pt on:  Patient health concerns. Patient was offered a choice/choices in the treatment plan today. Patient expresses understanding of the plan and agrees with recommendations.     Patient Instructions     TODAY, go to:   CHECK OUT    Please schedule the following appointments:  · Chest Xray    · Lab visit, fasting, lipids, in 5-6 months  · Prediabetes and cholesterol follow up with Dr. Eros Gong the week after labs    _____________________     Today you were seen for:    You have prediabetes  - cut down on your sugar intake  - no soda  - decrease amount of sweet teat  - limit candy and sweets      For nose symptoms- start flonase    For your back and neck- continue ibuprofen or acetaminophen when needed. Consider stretching, ice, heat, or massage. IcyHot. _____________________     Review your health maintenance below. Make plans to return and address   ? Subjective:   HPI:  Dimas Myrick is a 40 y.o. female being seen for:   Chief Complaint   Patient presents with    Complete Physical     Lab review  · New dx of prediabetes  · Eats sweets twice a day and candy all the time  · Drinks sodas everyday    Some cough, sneeze, runny nose. Back pain- mostly LBP, and tightness in shoulders. sometimes uses OTC medications  PFS- asks how long she will have to go to PT    Health Maintenance  · Home: lives with dtr and son  · Occupation: post office  · Diet: lots of sugar  · Exercise: walks daily 2/2 job  · Dental screening: not UTD, needs to get a dentist  · Vision screening: no glasses  · Domestic violence: denies   reports that she has never smoked. She has never used smokeless tobacco. She reports that she drinks alcohol. She reports that she does not use illicit drugs. OB Hx/Menstrual Hx/Sexualhx:  OB History      Para Term  AB TAB SAB Ectopic Multiple Living    3 3 3 0      3        · LMP: Patient's last menstrual period was 2017.   History   Sexual Activity    Sexual activity: Yes    Partners: Male    Birth control/ protection: None   ·    Concerns: none    Screening  · last pap smear was :UTD  · last mammo: Dec 2016[facility]    · A1C:   Lab Results   Component Value Date/Time    Hemoglobin A1c 5.8 2016 10:02 AM     · Depression:  PHQ , over the last two weeks 11/29/2016   Little interest or pleasure in doing things Not at all   Feeling down, depressed or hopeless Not at all   Total Score PHQ 2 0     Screening and Prevention Due:  There are no preventive care reminders to display for this patient. Review of Systems   Constitutional: Negative for fever. HENT: Negative. Eyes: Negative. Respiratory: Negative for shortness of breath. Cardiovascular: Negative for chest pain. Gastrointestinal: Negative for blood in stool, constipation and diarrhea. Genitourinary: Negative. Skin: Negative for rash. Neurological: Negative for headaches (intermittent, but not now). Otherwise, per HPI  Active Problem List:  Patient Active Problem List   Diagnosis Code    Prediabetes R73.03    Paresthesia R20.2     ? Objective:     Visit Vitals    /67 (BP 1 Location: Left arm, BP Patient Position: Sitting)    Pulse 73    Temp 98 °F (36.7 °C) (Oral)    Resp 16    Ht 5' 3.25\" (1.607 m)    Wt 161 lb 6.4 oz (73.2 kg)    SpO2 97%    BMI 28.37 kg/m2     PHQ 2 / 9, over the last two weeks 11/29/2016   Little interest or pleasure in doing things Not at all   Feeling down, depressed or hopeless Not at all   Total Score PHQ 2 0     Physical Exam   Constitutional: She appears well-developed and well-nourished. No distress. HENT:   Nose: Nose normal.   Mouth/Throat: Oropharynx is clear and moist. No oropharyngeal exudate. Eyes: Conjunctivae are normal. Pupils are equal, round, and reactive to light. Right eye exhibits no discharge. Left eye exhibits no discharge. No scleral icterus. Neck: Neck supple. Cardiovascular: Normal rate, regular rhythm and normal heart sounds. Exam reveals no gallop and no friction rub. No murmur heard. Pulmonary/Chest: Effort normal. No respiratory distress. She has no wheezes. She has rhonchi (expiratory) in the right upper field. She has no rales.    Breast exam declined by pt. since she recently had mammmogram   Abdominal: Soft. Bowel sounds are normal. She exhibits no distension and no mass. There is no tenderness. There is no rebound and no guarding. Musculoskeletal:        Cervical back: She exhibits tenderness (muscle ttp). Lumbar back: Tenderness: muscle ttp. Neurological: She is alert. She has normal strength. No sensory deficit. GCS eye subscore is 4. GCS verbal subscore is 5. GCS motor subscore is 6. Reflex Scores:       Bicep reflexes are 2+ on the right side and 2+ on the left side. Patellar reflexes are 2+ on the right side and 2+ on the left side. Skin: No rash noted. Psychiatric: She has a normal mood and affect. Her behavior is normal.     Allergies   Allergen Reactions    Percocet [Oxycodone-Acetaminophen] Other (comments)     Feels like bugs are crawling on her. Prior to Admission medications    Medication Sig Start Date End Date Taking? Authorizing Provider   fluticasone (FLONASE) 50 mcg/actuation nasal spray 2 Sprays by Both Nostrils route daily. 1/23/17  Yes Vesta Hopkins MD   amitriptyline (ELAVIL) 10 mg tablet Take 1 Tab by mouth nightly. 1/9/17  Yes Jadon Valladares MD   .. Past Medical History   Diagnosis Date    H/O iron deficiency anemia 1994       History reviewed. No pertinent past surgical history. Social History     Social History    Marital status: SINGLE     Spouse name: N/A    Number of children: N/A    Years of education: N/A     Occupational History    Not on file.      Social History Main Topics    Smoking status: Never Smoker    Smokeless tobacco: Never Used    Alcohol use Yes      Comment: rare, once every couple of years, may have 2 at a time    Drug use: No    Sexual activity: Yes     Partners: Male     Birth control/ protection: None     Other Topics Concern    Not on file     Social History Narrative       Family History   Problem Relation Age of Onset    Diabetes Maternal Grandmother     Stroke Maternal Grandmother     Seizures Sister      as a child    Diabetes Sister     Breast Cancer Maternal Aunt      dx in 31s-39s

## 2017-02-20 ENCOUNTER — OFFICE VISIT (OUTPATIENT)
Dept: NEUROLOGY | Age: 47
End: 2017-02-20

## 2017-02-20 DIAGNOSIS — G56.01 CARPAL TUNNEL SYNDROME OF RIGHT WRIST: Primary | ICD-10-CM

## 2017-02-20 NOTE — PROCEDURES
[facility] at [address] [facility] [address] [facility], [address] [facility]  Tel [phone]     Fax [phone]  Electrodiagnostic Study Report  Test Date:  2017    Patient: Prasanth Soliz : 1970 Physician: Brenda Milligan MD   Sex: Female  < Ref Phys: Dwayne Gold     Clinical Indication   Sensory loss in hand    Impression:  Right carpal tunnel syndrome. EMG & NCV Findings:  Evaluation of the right median motor nerve showed prolonged distal onset latency (6.5 ms). The right median/ulnar (palm) comparison nerve showed prolonged distal peak latency (Median Palm, 3.8 ms) and abnormal peak latency difference (Median Palm-Ulnar Palm, 1.9 ms). All remaining nerves (as indicated in the following tables) were within normal limits. Left vs. Right side comparison data for the median motor nerve indicates abnormal L-R latency difference (3.3 ms). All remaining left vs. right side differences were within normal limits. All F Wave latencies were within normal limits.         Nerve Conduction Studies  Anti Sensory Summary Table     Stim Site NR Peak (ms) P-T Amp (µV) Site1 Site2 Delta-P (ms) Dist (cm) Alan (m/s)   Right Radial Anti Sensory (Base 1st Digit)  25.2°C   Wrist    2.2 22.5 Wrist Base 1st Digit 2.2 0.0      Motor Summary Table     Stim Site NR Onset (ms) O-P Amp (mV) Site1 Site2 Delta-0 (ms) Dist (cm) Alan (m/s)   Left Median Motor (Abd Poll Brev)  24.7°C   Wrist    3.2 7.1 Elbow Wrist 4.2 24.0 70T   Elbow    7.4 6.3        Right Median Motor (Abd Poll Brev)  24.6°C   Wrist    6.5 7.8 Elbow Wrist 4.5 24.0 66T   Elbow    11.0 7.4        Left Ulnar Motor (Abd Dig Minimi)  24.7°C   Wrist    2.7 11.6 B Elbow Wrist 3.1 19.0 61   B Elbow    5.8 10.3 A Elbow B Elbow 1.7 10.0 59   A Elbow    7.5 11.0        Right Ulnar Motor (Abd Dig Minimi)  25.5°C   Wrist    2.5 9.1 B Elbow Wrist 3.2 19.5 61   B Elbow    5.7 9.1 A Elbow B Elbow 1.6 10.0 63   A Elbow    7.3 9.1          Comparison Summary Table     Stim Site NR Peak (ms) P-T Amp (µV) Site1 Site2 Delta-P (ms)   Left Median/Ulnar Palm Comparison (Wrist - 8cm)  24.6°C   Median Palm    2.2 50.0 Median Palm Ulnar Palm 0.2   Ulnar Palm    2.0 14.1      Right Median/Ulnar Palm Comparison (Wrist - 8cm)  25.2°C   Median Palm    3.8 8.7 Median Palm Ulnar Palm 1.9   Ulnar Palm    1.9 17.1        F Wave Studies     NR F-Lat (ms) L-R F-Lat (ms)   Right Median (Mrkrs) (Abd Poll Brev)  25.4°C      31.46    Right Ulnar (Mrkrs) (Abd Dig Min)  25.5°C      20.00      EMG     Side Muscle Nerve Root Ins Act Fibs Psw Amp Dur Poly Recrt Comment   Right Abd Poll Brev Median C8-T1 Nml Nml Nml Nml Nml 0 Nml    Right 1stDorInt Ulnar C8-T1 Nml Nml Nml Nml Nml 0 Nml    Right PronatorTeres Median C6-7 Nml Nml Nml Nml Nml 0 Nml    Right Biceps Musculocut C5-6 Nml Nml Nml Nml Nml 0 Nml    Right Deltoid Axillary C5-6 Nml Nml Nml Nml Nml 0 Nml    Right ABD Dig Min Ulnar C8-T1 Nml Nml Nml Nml Nml 0 Nml    Left Abd Poll Brev Median C8-T1 Nml Nml Nml Nml Nml 0 Nml        Waveforms:                           __________________  Rochelle Fleischer, M.D.

## 2017-04-10 ENCOUNTER — OFFICE VISIT (OUTPATIENT)
Dept: NEUROLOGY | Age: 47
End: 2017-04-10

## 2017-04-10 VITALS
DIASTOLIC BLOOD PRESSURE: 70 MMHG | OXYGEN SATURATION: 98 % | SYSTOLIC BLOOD PRESSURE: 132 MMHG | BODY MASS INDEX: 29.06 KG/M2 | WEIGHT: 164 LBS | HEIGHT: 63 IN | HEART RATE: 87 BPM

## 2017-04-10 DIAGNOSIS — G56.01 CARPAL TUNNEL SYNDROME OF RIGHT WRIST: Primary | ICD-10-CM

## 2017-04-10 NOTE — PATIENT INSTRUCTIONS
[facility]ly [facility]   Statement to Patients  April 1, 2014      In an effort to ensure the large volume of patient prescription refills is processed in the most efficient and expeditious manner, we are asking our patients to assist us by calling your Pharmacy for all prescription refills, this will include also your  Mail Order Pharmacy. The pharmacy will contact our office electronically to continue the refill process. Please do not wait until the last minute to call your pharmacy. We need at least 48 hours (2days) to fill prescriptions. We also encourage you to call your pharmacy before going to  your prescription to make sure it is ready. With regard to controlled substance prescription refill requests (narcotic refills) that need to be picked up at our office, we ask your cooperation by providing us with at least 72 hours (3days) notice that you will need a refill. We will not refill narcotic prescription refill requests after 4:00pm on any weekday, Monday through Thursday, or after 2:00pm on Fridays, or on the weekends. We encourage everyone to explore another way of getting your prescription refill request processed using YouGift, our patient web portal through our electronic medical record system. YouGift is an efficient and effective way to communicate your medication request directly to the office and  downloadable as an deepali on your smart phone . [employer]ift also features a review functionality that allows you to view your medication list as well as leave messages for your physician. Are you ready to get connected? If so please review the attatched instructions or speak to any of our staff to get you set up right away! Thank you so much for your cooperation. Should you have any questions please contact our Practice Administrator.     The Physicians and Staff,  [facility]

## 2017-04-10 NOTE — MR AVS SNAPSHOT
Visit Information Date & Time Provider Department Dept. Phone Encounter #  
 4/10/2017 10:00 AM Mirella Ingram NP Neurology Clinic at [facility] [phone] [id number] Follow-up Instructions Return in about 2 months (around 6/10/2017). Your Appointments 6/23/2017  8:00 AM  
LAB with LAB [facility] [employer] ([address] [place]) Appt Note: Dr. Jessica Dickinson Fasting, Lipids [address] [facility] [address] [facility] [address]  
  
   
 [address] [facility] [address]  
  
    
 6/30/2017 10:00 AM  
ROUTINE CARE with Charmayne Severe Andrea Barnes, [address] ([facility]) Appt Note: F/U Results [address] [facility] [address] [facility] [address]  
[phone]  
  
   
 [address] [facility] [address] [facility] [address] [facility] [address] [place] [address] Upcoming Health Maintenance Date Due DTaP/Tdap/Td series (1 - Tdap) 11/2/1992 PAP AKA CERVICAL CYTOLOGY 11/24/2019 Allergies as of 4/10/2017  Review Complete On: 2/21/2017 By: Anahi Laura MD  
  
 Severity Noted Reaction Type Reactions Percocet [Oxycodone-acetaminophen]  10/26/2012    Other (comments) Feels like bugs are crawling on her. Current Immunizations  Never Reviewed No immunizations on file. Not reviewed this visit You Were Diagnosed With   
  
 Codes Comments Carpal tunnel syndrome of right wrist    -  Primary ICD-10-CM: G56.01 
ICD-9-CM: 354.0 Vitals BP Pulse Height(growth percentile) Weight(growth percentile) SpO2 BMI  
 132/70 87 5' 3\" (1.6 m) 164 lb (74.4 kg) 98% 29.05 kg/m2 OB Status Smoking Status Having regular periods Never Smoker Vitals History BMI and BSA Data Body Mass Index Body Surface Area 29.05 kg/m 2 1.82 m 2 Preferred Pharmacy Pharmacy Name Phone  [facility] [address] [facility] [address] [phone] Your Updated Medication List  
  
   
This list is accurate as of: 4/10/17 10:32 AM.  Always use your most recent med list.  
  
  
  
  
 amitriptyline 10 mg tablet Commonly known as:  ELAVIL Take 1 Tab by mouth nightly. fluticasone 50 mcg/actuation nasal spray Commonly known as:  Carilyn Mckusick 2 Sprays by Both Nostrils route daily. Follow-up Instructions Return in about 2 months (around 6/10/2017). Patient Instructions PRESCRIPTION REFILL POLICY [facility] Statement to Patients April 1, 2014 In an effort to ensure the large volume of patient prescription refills is processed in the most efficient and expeditious manner, we are asking our patients to assist us by calling your Pharmacy for all prescription refills, this will include also your  Mail Order Pharmacy. The pharmacy will contact our office electronically to continue the refill process. Please do not wait until the last minute to call your pharmacy. We need at least 48 hours (2days) to fill prescriptions. We also encourage you to call your pharmacy before going to  your prescription to make sure it is ready. With regard to controlled substance prescription refill requests (narcotic refills) that need to be picked up at our office, we ask your cooperation by providing us with at least 72 hours (3days) notice that you will need a refill. We will not refill narcotic prescription refill requests after 4:00pm on any weekday, Monday through Thursday, or after 2:00pm on Fridays, or on the weekends. We encourage everyone to explore another way of getting your prescription refill request processed using Yelp, our patient web portal through our electronic medical record system.  [employer] is an efficient and effective way to communicate your medication request directly to the office and downloadable as an deepali on your smart phone . EVERFANS also features a review functionality that allows you to view your medication list as well as leave messages for your physician. Are you ready to get connected? If so please review the attatched instructions or speak to any of our staff to get you set up right away! Thank you so much for your cooperation. Should you have any questions please contact our Practice Administrator. The Physicians and Staff,  [facility] Introducing [facility]! Dear Shayan Blue: Thank you for requesting a EVERFANS account. Our records indicate that you already have an active EVERFANS account. You can access your account anytime at [email or web address] Did you know that you can access your hospital and ER discharge instructions at any time in EVERFANS? You can also review all of your test results from your hospital stay or ER visit. Additional Information If you have questions, please visit the Frequently Asked Questions section of the EVERFANS website at [email or web address] Remember, EVERFANS is NOT to be used for urgent needs. For medical emergencies, dial 911. Now available from your iPhone and Android! Please provide this summary of care documentation to your next provider. Your primary care clinician is listed as Gopi Coombs. Rahel Noble. If you have any questions after today's visit, please call [phone].
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
## 2017-06-23 DIAGNOSIS — R73.03 PREDIABETES: Primary | ICD-10-CM

## 2017-06-24 LAB
CHOLEST SERPL-MCNC: 168 MG/DL (ref 100–199)
EST. AVERAGE GLUCOSE BLD GHB EST-MCNC: 111 MG/DL
HBA1C MFR BLD: 5.5 % (ref 4.8–5.6)
HDLC SERPL-MCNC: 65 MG/DL
INTERPRETATION, 910389: NORMAL
LDLC SERPL CALC-MCNC: 94 MG/DL (ref 0–99)
TRIGL SERPL-MCNC: 45 MG/DL (ref 0–149)
VLDLC SERPL CALC-MCNC: 9 MG/DL (ref 5–40)

## 2017-06-28 NOTE — PROGRESS NOTES
Will review results in detail at upcoming office visit. 6/30/2017  10:00 AM   María Elena Ruth.  Jonathan Palafox MD       [address] [place] [address]

## 2017-07-05 ENCOUNTER — OFFICE VISIT (OUTPATIENT)
Dept: FAMILY MEDICINE CLINIC | Age: 47
End: 2017-07-05

## 2017-07-05 VITALS
OXYGEN SATURATION: 99 % | TEMPERATURE: 97.9 F | SYSTOLIC BLOOD PRESSURE: 116 MMHG | DIASTOLIC BLOOD PRESSURE: 63 MMHG | HEIGHT: 63 IN | WEIGHT: 163 LBS | BODY MASS INDEX: 28.88 KG/M2 | RESPIRATION RATE: 18 BRPM | HEART RATE: 68 BPM

## 2017-07-05 DIAGNOSIS — Z23 ENCOUNTER FOR IMMUNIZATION: ICD-10-CM

## 2017-07-05 DIAGNOSIS — Z00.00 HEALTH CARE MAINTENANCE: ICD-10-CM

## 2017-07-05 DIAGNOSIS — G56.00 CARPAL TUNNEL SYNDROME, UNSPECIFIED LATERALITY: ICD-10-CM

## 2017-07-05 DIAGNOSIS — R73.03 PREDIABETES: Primary | ICD-10-CM

## 2017-07-05 NOTE — MR AVS SNAPSHOT
Visit Information Date & Time Provider Department Dept. Phone Encounter #  
 7/5/2017 10:40 AM Rica Emanuel. Mike Mcnamara MD [facility] [phone] [id number] Upcoming Health Maintenance Date Due DTaP/Tdap/Td series (1 - Tdap) 11/2/1992 INFLUENZA AGE 9 TO ADULT 8/1/2017 PAP AKA CERVICAL CYTOLOGY 11/24/2019 Allergies as of 7/5/2017  Review Complete On: 7/5/2017 By: Fidel Lee LPN Severity Noted Reaction Type Reactions Percocet [Oxycodone-acetaminophen]  10/26/2012    Other (comments) Feels like bugs are crawling on her. Current Immunizations  Never Reviewed No immunizations on file. Not reviewed this visit Vitals BP Pulse Temp Resp Height(growth percentile) Weight(growth percentile) 116/63 (BP 1 Location: Left arm, BP Patient Position: Sitting) 68 97.9 °F (36.6 °C) (Oral) 18 5' 3\" (1.6 m) 163 lb (73.9 kg) LMP SpO2 BMI OB Status Smoking Status 06/29/2017 99% 28.87 kg/m2 Having regular periods Never Smoker BMI and BSA Data Body Mass Index Body Surface Area  
 28.87 kg/m 2 1.81 m 2 Preferred Pharmacy Pharmacy Name Phone [phone] , [address] AT [facility] [phone] Your Updated Medication List  
  
   
This list is accurate as of: 7/5/17 11:36 AM.  Always use your most recent med list.  
  
  
  
  
 amitriptyline 10 mg tablet Commonly known as:  ELAVIL Take 1 Tab by mouth nightly. fluticasone 50 mcg/actuation nasal spray Commonly known as:  Leafy Few 2 Sprays by Both Nostrils route daily. Patient Instructions Evita Marrero TODAY, please go to: CHECK OUT Tdap Please schedule the following appointments at [address] [place] [address]: 
· Lab visit, fasting in Jan 2018 · Complete Physical Exam and lab follow up with Dr. Mike Mcnamara the week after labs 
_____________________ Today's Plan: · Your blood sugar has improved. It is now normal.  
· Your cholesterol is well controlled. No medication needed. · Re-establish with a neurologist of your choosing. You can get splint/wrist brace from pharmacies such as [facility], [address] [place] [address], and others. _____________________ Are you stressed out? Overwhelmed? In pain? Feeling disconnected? Consider MINDFULNESS. .. 
[email or web address] 
_____________________ If you need to get your labs done at [facility], visit this site to find a convenient location: [email or web address]! Dear Ethan Clay: Thank you for requesting a [employer] account. Our records indicate that you already have an active [employer] account. You can access your account anytime at [email or web address] Did you know that you can access your hospital and ER discharge instructions at any time in [employer]? You can also review all of your test results from your hospital stay or ER visit. Additional Information If you have questions, please visit the Frequently Asked Questions section of the [employer] website at [email or web address] Remember, [employer] is NOT to be used for urgent needs. For medical emergencies, dial 911. Now available from your iPhone and Android! Please provide this summary of care documentation to your next provider. Your primary care clinician is listed as Klaudia Frederick. If you have any questions after today's visit, please call [phone].

## 2017-07-05 NOTE — PATIENT INSTRUCTIONS
.. TODAY, please go to:   CHECK OUT    Tdap    Please schedule the following appointments at [address] [place] [address]:  · Lab visit, fasting in Jan 2018  · Complete Physical Exam and lab follow up with Dr. Dharmesh Morales the week after labs  _____________________     Today's Plan:  · Your blood sugar has improved. It is now normal.   · Your cholesterol is well controlled. No medication needed. · Re-establish with a neurologist of your choosing. You can get splint/wrist brace from pharmacies such as [facility], [facility], and others. _____________________     Are you stressed out? Overwhelmed? In pain? Feeling disconnected? Consider MINDFULNESS. ..  [email or web address]  _____________________     If you need to get your labs done at [facility], visit this site to find a convenient location: [email or web address]

## 2017-07-05 NOTE — PROGRESS NOTES
..  This note will not be viewable in [address] Visit   Patient ID:   Annabelle Puente is a 40 y.o. female. Assessment/Plan:    [facility] was seen today for results. Diagnoses and all orders for this visit:    Prediabetes  Congratulated on normal a1c. Recheck in 6 months at [address]; Future    Carpal tunnel syndrome, unspecified laterality  Pt planning to re-establish with other neurologist. Declines referral or brace rx today. Health care maintenance  Tdap today  Labs for future physical exam ordered today. -     METABOLIC PANEL, COMPREHENSIVE; Future  -     CBC W/O DIFF; Future  -     HEMOGLOBIN A1C WITH EAG; Future  -     LIPID PANEL; Future          Next visit: CPE in one year, sooner if needed    Counselled pt on:  Patient health concerns, plan as outlined in patient instructions and above. Patient was offered a choice/choices in the treatment plan today. Patient expresses understanding of the plan and agrees with recommendations. Patient Instructions   . [employer] TODAY, please go to:   CHECK OUT    Tdap    Please schedule the following appointments at [address] [place] [address]:  · Lab visit, fasting in Jan 2018  · Complete Physical Exam and lab follow up with Dr. Orlando Iglesias the week after labs  _____________________     Today's Plan:  · Your blood sugar has improved. It is now normal.   · Your cholesterol is well controlled. No medication needed. · Re-establish with a neurologist of your choosing. You can get splint/wrist brace from pharmacies such as [facility], [facility], and others. _____________________     Are you stressed out? Overwhelmed? In pain? Feeling disconnected? Consider MINDFULNESS. ..  [email or web address]  _____________________     If you need to get your labs done at [facility], visit this site to find a convenient location: [email or web address]      Subjective: HPI:  Federico Alcantar is a 40 y.o. female being seen for:   Chief Complaint   Patient presents with    Results     routine follow up      Lab review  · Labs reviewed in detail. · ascvd 0.43%      History   Smoking Status    Never Smoker   Smokeless Tobacco    Never Used     Carpal tunnel  · Plans to find a different neurologist  · Declines referral assistance   · Does not currently have brace  · Would like help with FMLA from neurology  · Not using elavil consistently b/c feels like it is not helping    Screening and Prevention Due:  Health Maintenance Due   Topic Date Due    DTaP/Tdap/Td series (1 - Tdap) 11/02/1992   agreeable today     Review of Systems  Otherwise as noted in HPI    Active Problem List:  Patient Active Problem List   Diagnosis Code    Prediabetes R73.03    Paresthesia R20.2     ? Objective:     Visit Vitals    /63 (BP 1 Location: Left arm, BP Patient Position: Sitting)    Pulse 68    Temp 97.9 °F (36.6 °C) (Oral)    Resp 18    Ht 5' 3\" (1.6 m)    Wt 163 lb (73.9 kg)    SpO2 99%    BMI 28.87 kg/m2     Wt Readings from Last 3 Encounters:   07/05/17 163 lb (73.9 kg)   04/10/17 164 lb (74.4 kg)   01/23/17 161 lb 6.4 oz (73.2 kg)     BP Readings from Last 3 Encounters:   07/05/17 116/63   04/10/17 132/70   01/23/17 131/67     PHQ over the last two weeks 7/5/2017   Little interest or pleasure in doing things Not at all   Feeling down, depressed or hopeless Not at all   Total Score PHQ 2 0     Physical Exam   Constitutional: She appears well-developed and well-nourished. No distress. Pulmonary/Chest: Effort normal. No respiratory distress. Neurological: She is alert. Psychiatric: Her behavior is normal.   Irritable mood and affect today     Allergies   Allergen Reactions    Percocet [Oxycodone-Acetaminophen] Other (comments)     Feels like bugs are crawling on her. Prior to Admission medications    Medication Sig Start Date End Date Taking?  Authorizing Provider fluticasone (FLONASE) 50 mcg/actuation nasal spray 2 Sprays by Both Nostrils route daily. 1/23/17   Kacey Mann. Andria Moseley MD   amitriptyline (ELAVIL) 10 mg tablet Take 1 Tab by mouth nightly.  1/9/17   Allie Barker MD

## 2017-07-05 NOTE — PROGRESS NOTES
Chief Complaint   Patient presents with    Results     routine follow up      1. Have you been to the ER, urgent care clinic since your last visit? Hospitalized since your last visit? No     2. Have you seen or consulted any other health care providers outside of the [address] [place] [address] since your last visit? Include any pap smears or colon screening. No     The patient was counseled on the dangers of tobacco use, and was advised never to start. Reviewed strategies to maximize success, including never to start. Health Maintenance Due   Topic Date Due    DTaP/Tdap/Td series (1 - Tdap) Discussed with patient today and advised to follow up. Will be completed today. 11/02/1992       ACP is not on file, advised to return.

## 2017-07-23 DIAGNOSIS — R73.03 PREDIABETES: ICD-10-CM

## 2018-01-05 DIAGNOSIS — Z00.00 HEALTH CARE MAINTENANCE: ICD-10-CM

## 2018-01-05 DIAGNOSIS — R73.03 PREDIABETES: ICD-10-CM

## 2018-01-12 ENCOUNTER — OFFICE VISIT (OUTPATIENT)
Dept: FAMILY MEDICINE CLINIC | Age: 48
End: 2018-01-12

## 2018-01-12 VITALS
RESPIRATION RATE: 18 BRPM | DIASTOLIC BLOOD PRESSURE: 69 MMHG | WEIGHT: 171 LBS | SYSTOLIC BLOOD PRESSURE: 104 MMHG | TEMPERATURE: 97.7 F | HEIGHT: 63 IN | OXYGEN SATURATION: 97 % | HEART RATE: 72 BPM | BODY MASS INDEX: 30.3 KG/M2

## 2018-01-12 DIAGNOSIS — M79.672 PAIN IN BOTH FEET: ICD-10-CM

## 2018-01-12 DIAGNOSIS — M25.361 INSTABILITY OF RIGHT KNEE JOINT: ICD-10-CM

## 2018-01-12 DIAGNOSIS — M79.671 PAIN IN BOTH FEET: ICD-10-CM

## 2018-01-12 DIAGNOSIS — Z00.00 WELL WOMAN EXAM WITHOUT GYNECOLOGICAL EXAM: Primary | ICD-10-CM

## 2018-01-12 DIAGNOSIS — R73.03 PREDIABETES: ICD-10-CM

## 2018-01-12 NOTE — PATIENT INSTRUCTIONS
TODAY, please go to:   CHECK OUT     If you received a referral, Show the    LAB    Please schedule the following appointments at CHECK OUT:  · Prediabetes follow up with Dr. Luisito Loja in July 2018      Today's Plan:  - Flu season is coming! Remember to get your flu vaccine! Hand- see orthopedics- go to prior doctor  See podiatry  Do home exercises for knee   _____________________     Well Visit, Ages 25 to 48: Care Instructions  Your Care Instructions    Physical exams can help you stay healthy. Your doctor has checked your overall health and may have suggested ways to take good care of yourself. He or she also may have recommended tests. At home, you can help prevent illness with healthy eating, regular exercise, and other steps. Follow-up care is a key part of your treatment and safety. Be sure to make and go to all appointments, and call your doctor if you are having problems. It's also a good idea to know your test results and keep a list of the medicines you take. How can you care for yourself at home? · Reach and stay at a healthy weight. This will lower your risk for many problems, such as obesity, diabetes, heart disease, and high blood pressure. · Get at least 30 minutes of physical activity on most days of the week. Walking is a good choice. You also may want to do other activities, such as running, swimming, cycling, or playing tennis or team sports. Discuss any changes in your exercise program with your doctor. · Do not smoke or allow others to smoke around you. If you need help quitting, talk to your doctor about stop-smoking programs and medicines. These can increase your chances of quitting for good. · Talk to your doctor about whether you have any risk factors for sexually transmitted infections (STIs). Having one sex partner (who does not have STIs and does not have sex with anyone else) is a good way to avoid these infections.   · Use birth control if you do not want to have children at this time. Talk with your doctor about the choices available and what might be best for you. · Protect your skin from too much sun. When you're outdoors from 10 a.m. to 4 p.m., stay in the shade or cover up with clothing and a hat with a wide brim. Wear sunglasses that block UV rays. Even when it's cloudy, put broad-spectrum sunscreen (SPF 30 or higher) on any exposed skin. · See a dentist one or two times a year for checkups and to have your teeth cleaned. · Wear a seat belt in the car. · Drink alcohol in moderation, if at all. That means no more than 2 drinks a day for men and 1 drink a day for women. Follow your doctor's advice about when to have certain tests. These tests can spot problems early. For everyone  · Cholesterol. Have the fat (cholesterol) in your blood tested after age 21. Your doctor will tell you how often to have this done based on your age, family history, or other things that can increase your risk for heart disease. · Blood pressure. Have your blood pressure checked during a routine doctor visit. Your doctor will tell you how often to check your blood pressure based on your age, your blood pressure results, and other factors. · Vision. Talk with your doctor about how often to have a glaucoma test.  · Diabetes. Ask your doctor whether you should have tests for diabetes. · Colon cancer. Have a test for colon cancer at age 48. You may have one of several tests. If you are younger than 48, you may need a test earlier if you have any risk factors. Risk factors include whether you already had a precancerous polyp removed from your colon or whether your parent, brother, sister, or child has had colon cancer. For women  · Breast exam and mammogram. Talk to your doctor about when you should have a clinical breast exam and a mammogram. Medical experts differ on whether and how often women under 50 should have these tests.  Your doctor can help you decide what is right for you.  · Pap test and pelvic exam. Begin Pap tests at age 24. A Pap test is the best way to find cervical cancer. The test often is part of a pelvic exam. Ask how often to have this test.  · Tests for sexually transmitted infections (STIs). Ask whether you should have tests for STIs. You may be at risk if you have sex with more than one person, especially if your partners do not wear condoms. For men  · Tests for sexually transmitted infections (STIs). Ask whether you should have tests for STIs. You may be at risk if you have sex with more than one person, especially if you do not wear a condom. · Testicular cancer exam. Ask your doctor whether you should check your testicles regularly. · Prostate exam. Talk to your doctor about whether you should have a blood test (called a PSA test) for prostate cancer. Experts differ on whether and when men should have this test. Some experts suggest it if you are older than 39 and are -American or have a father or brother who got prostate cancer when he was younger than 72. When should you call for help? Watch closely for changes in your health, and be sure to contact your doctor if you have any problems or symptoms that concern you. Where can you learn more? Go to http://yari-jana.info/. Enter P072 in the search box to learn more about \"Well Visit, Ages 25 to 48: Care Instructions. \"  Current as of: May 12, 2017  Content Version: 11.4  © 4964-3647 [employer]. Care instructions adapted under license by [employer] (which disclaims liability or warranty for this information). If you have questions about a medical condition or this instruction, always ask your healthcare professional. [place] [address] any warranty or liability for your use of this information. Knee: Exercises  Your Care Instructions  Here are some examples of exercises for your knee. Start each exercise slowly.  Ease off the exercise if you start to have pain. Your doctor or physical therapist will tell you when you can start these exercises and which ones will work best for you. How to do the exercises  Quad sets    2. Sit with your leg straight and supported on the floor or a firm bed. (If you feel discomfort in the front or back of your knee, place a small towel roll under your knee.)  3. Tighten the muscles on top of your thigh by pressing the back of your knee flat down to the floor. (If you feel discomfort under your kneecap, place a small towel roll under your knee.)  4. Hold for about 6 seconds, then rest for up to 10 seconds. 5. Do 8 to 12 repetitions several times a day. Straight-leg raises to the front    1. Lie on your back with your good knee bent so that your foot rests flat on the floor. Your injured leg should be straight. Make sure that your low back has a normal curve. You should be able to slip your flat hand in between the floor and the small of your back, with your palm touching the floor and your back touching the back of your hand. 2. Tighten the thigh muscles in the injured leg by pressing the back of your knee flat down to the floor. Hold your knee straight. 3. Keeping the thigh muscles tight, lift your injured leg up so that your heel is about 12 inches off the floor. Hold for about 6 seconds and then lower slowly. 4. Do 8 to 12 repetitions, 3 times a day. Straight-leg raises to the outside    1. Lie on your side, with your injured leg on top. 2. Tighten the front thigh muscles of your injured leg to keep your knee straight. 3. Keep your hip and your leg straight in line with the rest of your body, and keep your knee pointing forward. Do not drop your hip back. 4. Lift your injured leg straight up toward the ceiling, about 12 inches off the floor. Hold for about 6 seconds, then slowly lower your leg. 5. Do 8 to 12 repetitions. Straight-leg raises to the back    1.  Lie on your stomach, and lift your leg straight up behind you (toward the ceiling). 2. Lift your toes about 6 inches off the floor, hold for about 6 seconds, then lower slowly. 3. Do 8 to 12 repetitions. Straight-leg raises to the inside    1. Lie on the side of your body with the injured leg. 2. You can either prop your other (good) leg up on a chair, or you can bend your good knee and put that foot in front of your injured knee. Do not drop your hip back. 3. Tighten the muscles on the front of your thigh to straighten your injured knee. 4. Keep your kneecap pointing forward, and lift your whole leg up toward the ceiling about 6 inches. Hold for about 6 seconds, then lower slowly. 5. Do 8 to 12 repetitions. Heel dig bridging    1. Lie on your back with both knees bent and your ankles bent so that only your heels are digging into the floor. Your knees should be bent about 90 degrees. 2. Then push your heels into the floor, squeeze your buttocks, and lift your hips off the floor until your shoulders, hips, and knees are all in a straight line. 3. Hold for about 6 seconds as you continue to breathe normally, and then slowly lower your hips back down to the floor and rest for up to 10 seconds. 4. Do 8 to 12 repetitions. Hamstring curls    1. Lie on your stomach with your knees straight. If your kneecap is uncomfortable, roll up a washcloth and put it under your leg just above your kneecap. 2. Lift the foot of your injured leg by bending the knee so that you bring the foot up toward your buttock. If this motion hurts, try it without bending your knee quite as far. This may help you avoid any painful motion. 3. Slowly lower your leg back to the floor. 4. Do 8 to 12 repetitions. 5. With permission from your doctor or physical therapist, you may also want to add a cuff weight to your ankle (not more than 5 pounds). With weight, you do not have to lift your leg more than 12 inches to get a hamstring workout.   Shallow standing knee bends    Do this exercise only if you have very little pain; if you have no clicking, locking, or giving way if you have an injured knee; and if it does not hurt while you are doing 8 to 12 repetitions. 1. Stand with your hands lightly resting on a counter or chair in front of you. Put your feet shoulder-width apart. 2. Slowly bend your knees so that you squat down like you are going to sit in a chair. Make sure your knees do not go in front of your toes. 3. Lower yourself about 6 inches. Your heels should remain on the floor at all times. 4. Rise slowly to a standing position. Heel raises    1. Stand with your feet a few inches apart, with your hands lightly resting on a counter or chair in front of you. 2. Slowly raise your heels off the floor while keeping your knees straight. 3. Hold for about 6 seconds, then slowly lower your heels to the floor. 4. Do 8 to 12 repetitions several times during the day. Follow-up care is a key part of your treatment and safety. Be sure to make and go to all appointments, and call your doctor if you are having problems. It's also a good idea to know your test results and keep a list of the medicines you take. Where can you learn more? Go to http://yari-jana.info/. Enter S407 in the search box to learn more about \"Knee: Exercises. \"  Current as of: March 21, 2017  Content Version: 11.4  © 8039-0016 [employer], [employer]. Care instructions adapted under license by [employer] (which disclaims liability or warranty for this information). If you have questions about a medical condition or this instruction, always ask your healthcare professional. [place] [address] any warranty or liability for your use of this information.
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
## 2018-01-12 NOTE — PROGRESS NOTES
Chief Complaint   Patient presents with    Complete Physical     1. Have you been to the ER, urgent care clinic since your last visit? Hospitalized since your last visit? No     2. Have you seen or consulted any other health care providers outside of the [address] [place] [address] since your last visit? Include any pap smears or colon screening. No     The patient was counseled on the dangers of tobacco use, and was advised never to start. Reviewed strategies to maximize success, including never to start. Health Maintenance Due   Topic Date Due    Influenza Age 5 to Adult Discussed with patient today and advised to follow up.    08/01/2017       Health Maintenance  · Diet:   · What do you want to improve about your diet? Nothing   · What is going well? Nothing   · Any foods you do not eat at all? No   · Exercise:  · What do you want to improve about your physical activity? Exercise more   · What is going well? Nothing   · Dental screening:   · Brushing twice a day? Yes   · Seeing Dentist every 6 months? Yes   · Vision screening:   · Glasses or contacts? No   · Seeing eye doctor at least every 2 years? No         ACP is not on file, advised to return.

## 2018-01-13 LAB
ALBUMIN SERPL-MCNC: 4.1 G/DL (ref 3.5–5.5)
ALBUMIN/GLOB SERPL: 1.5 {RATIO} (ref 1.2–2.2)
ALP SERPL-CCNC: 64 IU/L (ref 39–117)
ALT SERPL-CCNC: 20 IU/L (ref 0–32)
AST SERPL-CCNC: 16 IU/L (ref 0–40)
BILIRUB SERPL-MCNC: 0.3 MG/DL (ref 0–1.2)
BUN SERPL-MCNC: 16 MG/DL (ref 6–24)
BUN/CREAT SERPL: 23 (ref 9–23)
CALCIUM SERPL-MCNC: 9.2 MG/DL (ref 8.7–10.2)
CHLORIDE SERPL-SCNC: 100 MMOL/L (ref 96–106)
CHOLEST SERPL-MCNC: 178 MG/DL (ref 100–199)
CO2 SERPL-SCNC: 23 MMOL/L (ref 18–29)
CREAT SERPL-MCNC: 0.7 MG/DL (ref 0.57–1)
ERYTHROCYTE [DISTWIDTH] IN BLOOD BY AUTOMATED COUNT: 14.3 % (ref 12.3–15.4)
EST. AVERAGE GLUCOSE BLD GHB EST-MCNC: 108 MG/DL
GLOBULIN SER CALC-MCNC: 2.8 G/DL (ref 1.5–4.5)
GLUCOSE SERPL-MCNC: 83 MG/DL (ref 65–99)
HBA1C MFR BLD: 5.4 % (ref 4.8–5.6)
HCT VFR BLD AUTO: 36.5 % (ref 34–46.6)
HDLC SERPL-MCNC: 67 MG/DL
HGB BLD-MCNC: 11.6 G/DL (ref 11.1–15.9)
INTERPRETATION, 910389: NORMAL
LDLC SERPL CALC-MCNC: 97 MG/DL (ref 0–99)
MCH RBC QN AUTO: 27.8 PG (ref 26.6–33)
MCHC RBC AUTO-ENTMCNC: 31.8 G/DL (ref 31.5–35.7)
MCV RBC AUTO: 88 FL (ref 79–97)
PLATELET # BLD AUTO: 258 X10E3/UL (ref 150–379)
POTASSIUM SERPL-SCNC: 4.5 MMOL/L (ref 3.5–5.2)
PROT SERPL-MCNC: 6.9 G/DL (ref 6–8.5)
RBC # BLD AUTO: 4.17 X10E6/UL (ref 3.77–5.28)
SODIUM SERPL-SCNC: 137 MMOL/L (ref 134–144)
TRIGL SERPL-MCNC: 69 MG/DL (ref 0–149)
VLDLC SERPL CALC-MCNC: 14 MG/DL (ref 5–40)
WBC # BLD AUTO: 5.3 X10E3/UL (ref 3.4–10.8)

## 2018-04-13 NOTE — PROGRESS NOTES
Summary of the labs from your last physical.   Your electrolytes are normal.   Your kidney function is normal.   Your liver function is normal.  Your red blood cell count is normal.   Your white blood cell count is normal.   Your platelet count is normal.   You do not have diabetes (or prediabetes anymore!).   Cholesterol values are at goal.     \"Good\" cholesterol, HDL goal >40  \"Bad\" cholesterol, LDL less than 100  Triglyceride level, goal is less than 150  Best,  Dr. Mateusz Alcantara